# Patient Record
Sex: FEMALE | Race: WHITE
[De-identification: names, ages, dates, MRNs, and addresses within clinical notes are randomized per-mention and may not be internally consistent; named-entity substitution may affect disease eponyms.]

---

## 2019-07-04 ENCOUNTER — HOSPITAL ENCOUNTER (EMERGENCY)
Dept: HOSPITAL 25 - UCCORT | Age: 24
Discharge: HOME | End: 2019-07-04
Payer: COMMERCIAL

## 2019-07-04 VITALS — DIASTOLIC BLOOD PRESSURE: 76 MMHG | SYSTOLIC BLOOD PRESSURE: 120 MMHG

## 2019-07-04 DIAGNOSIS — J32.9: Primary | ICD-10-CM

## 2019-07-04 DIAGNOSIS — Z88.0: ICD-10-CM

## 2019-07-04 PROCEDURE — 99211 OFF/OP EST MAY X REQ PHY/QHP: CPT

## 2019-07-04 PROCEDURE — G0463 HOSPITAL OUTPT CLINIC VISIT: HCPCS

## 2019-07-04 NOTE — UC
Throat Pain/Nasal Conrado HPI





- HPI Summary


HPI Summary: 





Head Congested for 2-3 days. C/o dizziness and feeling foggy and unbalanced. 





- History of Current Complaint


Chief Complaint: UCGeneralIllness


Stated Complaint: CONGESTION,DIZZY, "FOGGY"


Time Seen by Provider: 07/04/19 10:50


Hx Obtained From: Patient


Hx Last Menstrual Period: 2 weeks ago


Onset/Duration: Sudden Onset, Lasting Days


Severity: Mild


Pain Intensity: 0


Associated Signs & Symptoms: Positive: Sinus Discomfort, Nasal Discharge





- Allergies/Home Medications


Allergies/Adverse Reactions: 


 Allergies











Allergy/AdvReac Type Severity Reaction Status Date / Time


 


amoxicillin Allergy  GI Upset Verified 07/04/19 10:43














PMH/Surg Hx/FS Hx/Imm Hx


Previously Healthy: Yes





- Surgical History


Surgical History: None





- Family History


Known Family History: Positive: None


   Negative: Cardiac Disease, Hypertension





- Social History


Alcohol Use: None


Substance Use Type: None


Smoking Status (MU): Never Smoked Tobacco


Have You Smoked in the Last Year: No





- Immunization History


Vaccination Up to Date: Yes





Review of Systems


All Other Systems Reviewed And Are Negative: Yes


ENT: Positive: Nasal Discharge, Sinus Congestion


Neurological: Positive: Other - dizzyness





Physical Exam


Triage Information Reviewed: Yes


Appearance: No Pain Distress, Well-Nourished, Ill-Appearing


Vital Signs: 


 Initial Vital Signs











Temp  98.3 F   07/04/19 10:39


 


Pulse  76   07/04/19 10:39


 


Resp  15   07/04/19 10:39


 


BP  120/76   07/04/19 10:39


 


Pulse Ox  100   07/04/19 10:39











Vital Signs Reviewed: Yes


Eye Exam: Normal


ENT: Positive: Pharyngeal erythema - with PND, Nasal congestion, Nasal drainage

, TM bulging - bilateral with serous otitis bilateral


Dental Exam: Normal


Neck exam: Normal


Respiratory Exam: Normal


Respiratory: Positive: Chest non-tender, Lungs clear, Normal breath sounds


Cardiovascular Exam: Normal


Cardiovascular: Positive: RRR, No Murmur


Abdominal Exam: Normal


Bowel Sounds: Positive: Present


Musculoskeletal Exam: Normal


Neurological Exam: Normal


Psychological Exam: Normal


Skin Exam: Normal





Throat Pain/Nasal Course/Dx





- Course


Course Of Treatment: 





hx obtained, exam performed, meds reviewed, educated on self care of sinus 

congestion





- Differential Dx/Diagnosis


Differential Diagnosis/HQI/PQRI: Otitis Media, Pharyngitis, Sinusitis, URI


Provider Diagnosis: 


 Rhinosinusitis








Discharge





- Sign-Out/Discharge


Documenting (check all that apply): Patient Departure


All imaging exams completed and their final reports reviewed: No Studies





- Discharge Plan


Condition: Stable


Disposition: HOME


Patient Education Materials:  Rhinosinusitis (ED)


Referrals: 


Gini Walsh MD [Primary Care Provider] - 


Additional Instructions: 


1. increase fluids


2. Salt water gargles if sore throat, it will help remove the mucous from the 

back of the throat


3. Daily antihistamine for the next 2-3 weeks


4. OTHER: daily tbs. of local honey for the next 9-12 months will help with 

seasonal allergies


5. VICKS, HOT PACKS to THROAT, REST


6. Follow up as needed.





- Billing Disposition and Condition


Condition: STABLE


Disposition: Home

## 2019-07-11 ENCOUNTER — HOSPITAL ENCOUNTER (EMERGENCY)
Dept: HOSPITAL 25 - UCCORT | Age: 24
Discharge: HOME | End: 2019-07-11
Payer: COMMERCIAL

## 2019-07-11 VITALS — DIASTOLIC BLOOD PRESSURE: 76 MMHG | SYSTOLIC BLOOD PRESSURE: 116 MMHG

## 2019-07-11 DIAGNOSIS — H60.92: ICD-10-CM

## 2019-07-11 DIAGNOSIS — Z88.0: ICD-10-CM

## 2019-07-11 DIAGNOSIS — J32.9: ICD-10-CM

## 2019-07-11 DIAGNOSIS — Z88.1: ICD-10-CM

## 2019-07-11 DIAGNOSIS — H66.92: Primary | ICD-10-CM

## 2019-07-11 PROCEDURE — 99212 OFFICE O/P EST SF 10 MIN: CPT

## 2019-07-11 PROCEDURE — G0463 HOSPITAL OUTPT CLINIC VISIT: HCPCS

## 2019-07-11 NOTE — XMS REPORT
Continuity of Care Document (CCD)

 Created on:2019



Patient:Janna Ryan

Sex:Female

:1995

External Reference #:[id number]





Demographics







 Address  [address] [place] [address]

 

 Home Phone  [phone]

 

 Mobile Phone  [phone]

 

 Email Address  [email or web address]

 

 Preferred Language  en

 

 Marital Status  Not  or 

 

 Synagogue Affiliation  Unknown

 

 Race  White

 

 Ethnic Group  Not  or 









Author







 Name  Jonathan E. Cryer, MD

 

 Address  [address]



   Unavailable



   [place] [address]









Support







 Name  Relationship  Address  Phone

 

 Kandace Ryan  Mother  Unavailable  [phone]









Care Team Providers







 Name  Role  Phone

 

 Gini Alfonso M.D.  Care Team Information   Unavailable

 

 Gini Alfonso M.D.  Primary Care Physician  Unavailable









Payers







 Date  Identification Numbers  Payment Provider  Subscriber

 

 Effective:  Policy Number: [id number]  [employer]  Vania Ryan



 2019    [place]  









 PayID: [id number]  [address] [address]









 [place] [address]







Family History







 Date  Family Member(s)  Observation  Comments

 

   General  Allergies  

 

   General  Cancer  







Social History







 Type  Date  Description  Comments

 

 Birth Sex    Unknown  

 

 Occupation    /  

 

 Occupation    Teacher  Substitute

 

 Occupation    Student  

 

 Occupation    [employer]  

 

 Tobacco Use  Start: Unknown  Never Smoked Cigarettes  

 

 Tobacco Use  Start: Unknown  Never Smoked Cigars  

 

 Tobacco Use  Start: Unknown  Never Smoked A Pipe  

 

 Smokeless Tobacco    Never Used Smokeless Tobacco  

 

 ETOH Use    Denies alcohol use  

 

 Tobacco Use  Start: Unknown  Patient has never smoked  

 

 Smoking Status  Reviewed: 19  Patient has never smoked  







Allergies, Adverse Reactions, Alerts







 Active Allergies  Reaction  Severity  Comments  Date

 

 Amoxicillin        2019

 

 Latex        2019

 

 Zithromax        2019







Medications







 Active Medications  SIG  Qnty  Indications  Ordering Provider  Date

 

 Womens One Daily  1 by mouth every      Unknown  



               Tablets  day        



           

 

 Calcium 600  1 by mouth twice      Unknown  



         600mg Tablets  a day        



           

 

 Birth Control Pills  1 by mouth every      Unknown  



   day        

 

 UTI Antibiotic  prn      Unknown  







Vital Signs







 Date  Vital  Result  Comment

 

 2019  9:16am  Weight  124.00 lb  









 Weight  56.246 kg  

 

 Height  66 inches  5'6"

 

 Height in cm's  167.6 cm  

 

 BMI (Body Mass Index)  20.0 kg/m2  







Procedures







 Date  Code  Description  Status

 

 2019  87311  Fiberoptic Laryngoscopy  Completed







Encounters







 Type  Date  Location  Provider  Dx  Diagnosis

 

 Office Visit  2019  Kameron,Jenifer DELGADO  J38.2  Nodules of vocal



   9:15a  08  Cryer, MD    cords







Plan of Treatment

2019 - Jonathan E. Cryer, MDJ38.2 Nodules of vocal cordsNew Orders:Speech 
Therapy, Ordered: 19

## 2019-07-11 NOTE — UC
Ear Complaint HPI





- HPI Summary


HPI Summary: 


24-year-old woman comes in with a chief complaint of left ear pain.  Patient 

has had upper respiratory tract infection symptoms for 10-11 days.  She has 

been trying Claritin.  Her rhinorrhea has turned yellow.  This morning she woke 

up with a severe left ear pain.  She took some acetaminophen which did not 

help.  No fevers.  Hearing is decreased in the left ear.  Patient is not a 

smoker.





- History of Current Complaint


Chief Complaint: UCEar


Stated Complaint: LEFT EAR PAIN


Time Seen by Provider: 07/11/19 07:44


Hx Last Menstrual Period: ~6/20/19


Pain Intensity: 8





- Allergies/Home Medications


Allergies/Adverse Reactions: 


 Allergies











Allergy/AdvReac Type Severity Reaction Status Date / Time


 


amoxicillin AdvReac  GI Upset Verified 07/11/19 07:32


 


azithromycin AdvReac  Vomiting Verified 07/11/19 07:32











Home Medications: 


 Home Medications





Acetaminophen TAB* [Tylenol TAB*] 650 mg PO Q4H PRN 07/11/19 [History Confirmed 

07/11/19]


LoraTADine TAB(NF) [Claritin 10 MG TAB(NF)] 10 mg PO DAILY 07/11/19 [History 

Confirmed 07/11/19]


Norethindrone-E.estradiol-Iron [Tri-Legest Fe] 1 tab PO DAILY 07/11/19 [History 

Confirmed 07/11/19]











PMH/Surg Hx/FS Hx/Imm Hx


Previously Healthy: Yes





- Surgical History


Surgical History: None





- Family History


Known Family History: Positive: None


   Negative: Cardiac Disease, Hypertension





- Social History


Alcohol Use: None


Substance Use Type: None


Smoking Status (MU): Never Smoked Tobacco


Have You Smoked in the Last Year: No





- Immunization History


Vaccination Up to Date: Yes





Review of Systems


All Other Systems Reviewed And Are Negative: Yes


Constitutional: Positive: Negative


Skin: Positive: Negative


Eyes: Positive: Negative


ENT: Positive: Ear Ache, Nasal Discharge, Sinus Congestion, Sinus Pain/

Tenderness


Respiratory: Positive: Negative


Cardiovascular: Positive: Negative


Gastrointestinal: Positive: Negative


Motor: Positive: Negative


Neurovascular: Positive: Negative


Musculoskeletal: Positive: Negative


Neurological: Positive: Negative


Psychological: Positive: Negative


Is Patient Immunocompromised?: No





Physical Exam


Triage Information Reviewed: Yes


Appearance: Well-Nourished, Ill-Appearing - MILD


Vital Signs: 


 Initial Vital Signs











Temp  98 F   07/11/19 07:31


 


Pulse  84   07/11/19 07:31


 


Resp  16   07/11/19 07:31


 


BP  116/76   07/11/19 07:31


 


Pulse Ox  98   07/11/19 07:31











Vital Signs Reviewed: Yes


Eye Exam: Normal


Eyes: Positive: Conjunctiva Clear


ENT: Positive: Pharyngeal erythema, Nasal congestion, Nasal drainage, TM 

bulging - LEFT, TM red - LEFT, Other - LEFT TRAGUS TENDER TO PALPATION. LEFT 

EAR CANAL ERYTHEMATOUS.


Neck: Positive: Supple


Respiratory: Positive: Lungs clear, Normal breath sounds, No respiratory 

distress


Cardiovascular: Positive: RRR


Musculoskeletal: Positive: Strength Intact, ROM Intact


Neurological Exam: Normal


Neurological: Positive: Alert, Muscle Tone Normal


Psychological Exam: Normal


Psychological: Positive: Age Appropriate Behavior


Skin Exam: Normal





Ear Complaint Course/Dx





- Differential Dx/Diagnosis


Provider Diagnosis: 


 Left otitis media, Left otitis externa, Sinusitis








Discharge





- Sign-Out/Discharge


Documenting (check all that apply): Patient Departure


All imaging exams completed and their final reports reviewed: No Studies





- Discharge Plan


Condition: Stable


Disposition: HOME


Prescriptions: 


Cefdinir [Cefdinir 300 MG CAP] 300 mg PO BID #20 cap


Fluticasone NASAL SPRAY 50MCG* [Flonase NASAL SPRAY 50MCG*] 2 spray BOTH NARES 

DAILY #1 btl


Ofloxacin 0.3% (Ear Drop)* [Floxin 0.3% OTIC.SOL (Ear Drop)] 5 drop LEFT EAR 

BID #1 btl


Patient Education Materials:  Sinusitis (ED), Otitis Externa (ED), Ear 

Infection (ED)


Forms:  *Work Release


Referrals: 


Gini Walsh MD [Primary Care Provider] - 


Additional Instructions: 


FOLLOW UP WITH YOUR DOCTOR IF NOT COMPLETELY IMPROVED.


GET REEVALUATED SOONER IF WORSE OR ANY QUESTIONS OR CONCERNS.








- Billing Disposition and Condition


Condition: STABLE


Disposition: Home

## 2019-07-16 ENCOUNTER — HOSPITAL ENCOUNTER (EMERGENCY)
Dept: HOSPITAL 25 - UCCORT | Age: 24
Discharge: HOME | End: 2019-07-16
Payer: COMMERCIAL

## 2019-07-16 VITALS — DIASTOLIC BLOOD PRESSURE: 70 MMHG | SYSTOLIC BLOOD PRESSURE: 107 MMHG

## 2019-07-16 DIAGNOSIS — R05: ICD-10-CM

## 2019-07-16 DIAGNOSIS — H66.92: ICD-10-CM

## 2019-07-16 DIAGNOSIS — Z88.1: ICD-10-CM

## 2019-07-16 DIAGNOSIS — Z88.0: ICD-10-CM

## 2019-07-16 DIAGNOSIS — Z51.89: Primary | ICD-10-CM

## 2019-07-16 PROCEDURE — 99211 OFF/OP EST MAY X REQ PHY/QHP: CPT

## 2019-07-16 PROCEDURE — G0463 HOSPITAL OUTPT CLINIC VISIT: HCPCS

## 2019-07-16 NOTE — UC
Ear Complaint HPI





- HPI Summary


HPI Summary: 





25 y/o female presents to the urgent care requesting a RE-check in her otitis 

media.  Pt reports ligia was seen here at the clinic on 7/11/2019 and Dx w/ left 

otitis Media and externa and Rx Cefdinir and ofloxacin. She still taking her 

oral antibiotic. She finished the otic drops about 2 day ago and yesterday b/L 

ear pain returned.  Pain is mild 2/10 associated nasal congestion w/ moderate 

PND clear and persistent dry cough.  Pt denies fever, SOB, chest pain, 

abdominal pain, N/V/D. She states she saw DR Cryer about 2 weeks ago for her 

throat nodules and her ear were fine.  











- History of Current Complaint


Stated Complaint: RECHECK - EAR ACHE


Time Seen by Provider: 07/16/19 12:24


Hx Obtained From: Patient


Hx Last Menstrual Period: ~6/20/19


Pregnant?: No


Onset/Duration: Gradual Onset, Lasting Weeks - 2 weeks, Still Present


Severity Initially: Mild


Severity Currently: Mild


Pain Intensity: 2 - B/L ear pain


Pain Scale Used: 0-10 Numeric


Aggravating Factors: Nothing


Alleviating Factors: Other (Noted In Comments) - Pt Rx Cefdinir PO and 

ofloxacin otic drops here at the clinic on 7/11/2019 for left otitis media and 

externa.  Pt still taking antibitics and otic drops already finished


Associated Signs/Symptoms: Positive: URI Symptoms


Related History: Seasonal Allergies





- Allergies/Home Medications


Allergies/Adverse Reactions: 


 Allergies











Allergy/AdvReac Type Severity Reaction Status Date / Time


 


amoxicillin AdvReac  GI Upset Verified 07/11/19 07:32


 


azithromycin AdvReac  Vomiting Verified 07/11/19 07:32











Home Medications: 


 Home Medications





Ibuprofen 800 mg PO SEE INSTRUCTIONS PRN 07/16/19 [History Confirmed 07/16/19]











PMH/Surg Hx/FS Hx/Imm Hx


Previously Healthy: Yes - Pt denies PMHX





- Surgical History


Surgical History: None





- Family History


Known Family History: Positive: None - Pt denies FMHX


   Negative: Cardiac Disease, Hypertension





- Social History


Occupation: Employed Full-time


Lives: With Family


Alcohol Use: None


Substance Use Type: None


Smoking Status (MU): Never Smoked Tobacco


Have You Smoked in the Last Year: No





- Immunization History


Vaccination Up to Date: Yes





Review of Systems


All Other Systems Reviewed And Are Negative: Yes


Constitutional: Positive: Negative


Skin: Positive: Negative


Eyes: Positive: Negative


ENT: Positive: Ear Ache - B/L ear pain, Sinus Congestion - getting better, 

Other - PND clear


Respiratory: Positive: Cough - dry


Cardiovascular: Positive: Negative


Gastrointestinal: Positive: Negative


Genitourinary: Positive: Negative


Motor: Positive: Negative


Neurovascular: Positive: Negative


Musculoskeletal: Positive: Negative


Neurological: Positive: Negative


Psychological: Positive: Negative


Is Patient Immunocompromised?: No





Physical Exam





- Summary


Physical Exam Summary: 





Vital signs: reviewed


General: well developed, well nourished female sitting in the examining table w/

o any apparent distress


Skin: Pink, warm and dry, no evidence of atopic dermatitis, psoriasis, 

seborrhea.


HEENT:


-Head: atraumatic, non tender; no scalp dermatitis.


-Eyes: sclera and conjunctiva clear, PERRLA, EOMI


-Ears: no pre- or postauricular lymphadenopathy or erythema;B/L external ear 

canal clears, Rt TM WNL,  LF TM w/ mild erythema, no fluid , mild bulging TM no 

perforation.


-Nose/Face: erythematous and edematous nasal mucosa with clear rhinorrhea, no 

frontal or maxillary sinus tender to palpation.


-Mouth/Throat: Mucous membrane moist, posterior pharynx clear, no erythema or 

exudates.


Neck: supple, FROM, nontender, no lymphadenopathy, no meningismus.


Chest: Clear to auscultation, normal breath sounds


Abd: soft, Bowel sounds active, Nontender.


Back: no spinal or CVAT


Neuro: A&O x4, GCS 15, no focal neuro deficits, normal behavior for age.





Triage Information Reviewed: Yes





Ear Complaint Course/Dx





- Course


Course Of Treatment: 


25 y/o female presents to the urgent care requesting a RE-check in her otitis 

media.  Pt reports ligia was seen here at the clinic on 7/11/2019 and Dx w/ left 

otitis Media and externa and Rx Cefdinir and ofloxacin. She still taking her 

oral antibiotic. She finished the otic drops about 2 day ago and yesterday b/L 

ear pain returned.  Pain is mild 2/10 associated nasal congestion w/ moderate 

PND clear and persistent dry cough.  Pt denies fever, SOB, chest pain, 

abdominal pain, N/V/D. She states she saw DR Cryer about 2 weeks ago for her 

throat nodules and her ear were fine.  Hx obtained.Pt w still w/ mild  otitis 

media on examination. It think it is resolving now.  Pt advised to continue 

taking Cefdinir, flonasa nasal spray. Advise to use saline drops to clear 

sinuses and Rx Tessalon tabs to alleviate cough.  Strongly advised if symptoms 

do not improve or worsen to f/u w/ her ENT DR Cryer for  further management. D/

c instructions explaiend.  Pt  understood and agreed with D/C  w/ plan of care. 








- Differential Dx/Diagnosis


Differential Diagnosis/HQI/PQRI: Cerumen Impaction, Otitis Externa, Otitis Media

, Perforated TM, URI


Provider Diagnosis: 


 Left otitis media, Cough








Discharge





- Sign-Out/Discharge


Documenting (check all that apply): Patient Departure - D/c home


All imaging exams completed and their final reports reviewed: No Studies





- Discharge Plan


Condition: Stable


Disposition: HOME


Prescriptions: 


Benzonatate CAP* [Tessalon 100 MG CAP*] 100 mg PO TID #21 cap


Patient Education Materials:  Ear Infection (ED)


Referrals: 


Gini Walsh MD [Primary Care Provider] - 3 Days


Additional Instructions: 


1- Please continue taking Cefdinir PO as directed full course of the antibiotic 

to avoid resistance.Take yogurts w/ probiotics or Culturelle to protect your GI 

system


2-Continue using  Flonase nasal spray as directed to help drain fluid. Also buy 

saline drops to clear sinuses


3-Continue taking Claritin PO  to alleviates sinus congestion.  Take Tessalon 

PO as directed to alleviate cough 


4-Please take ibuprofen PO q6-8hrs prn as instructed after meals to alleviate 

pain and swelling. Increase fluid intake, eat well, rest and avoid strenuous 

exercise


5-Stop using the Ofloxacin otic drops since esterna ear infection has resolved. 


6-Please f/u w/ your  PCP in 3 days if symptoms do not improve for further 

management and treatment




















- Billing Disposition and Condition


Condition: STABLE


Disposition: Home

## 2024-02-16 ENCOUNTER — OFFICE VISIT (OUTPATIENT)
Dept: URGENT CARE | Facility: CLINIC | Age: 29
End: 2024-02-16
Payer: COMMERCIAL

## 2024-02-16 VITALS
SYSTOLIC BLOOD PRESSURE: 120 MMHG | HEIGHT: 66 IN | HEART RATE: 73 BPM | WEIGHT: 142 LBS | OXYGEN SATURATION: 99 % | RESPIRATION RATE: 14 BRPM | TEMPERATURE: 98.5 F | DIASTOLIC BLOOD PRESSURE: 72 MMHG | BODY MASS INDEX: 22.82 KG/M2

## 2024-02-16 DIAGNOSIS — K52.9 NONINFECTIOUS GASTROENTERITIS, UNSPECIFIED TYPE: Primary | ICD-10-CM

## 2024-02-16 PROCEDURE — G0382 LEV 3 HOSP TYPE B ED VISIT: HCPCS | Performed by: NURSE PRACTITIONER

## 2024-02-16 RX ORDER — ASCORBIC ACID 125 MG
TABLET,CHEWABLE ORAL
COMMUNITY

## 2024-02-17 NOTE — PROGRESS NOTES
[facility]        NAME: Yadira Bellamy is a 28 y.o. female  : 1995    MRN: [id number]  DATE: 2024  TIME: 7:25 PM    Assessment and Plan   Noninfectious gastroenteritis, unspecified type [K52.9]  1. Noninfectious gastroenteritis, unspecified type            Recommended brat diet.  Advised to increase fluid intake.  May use over-the-counter antidiarrheal medications including Imodium.  Advised to seek further treatment by primary care provider in the ER with worsening diarrhea, abdominal pain, fever, or bloody stool.  Patient Instructions       Follow up with PCP in 3-5 days.  Proceed to  ER if symptoms worsen.    Chief Complaint     Chief Complaint   Patient presents with    Vomiting     N/V/D x 1 week with abdominal pain.          History of Present Illness       Patient is a 28-year-old female presenting with complaint of vomiting and diarrhea.  She states that she had 2 episodes of vomiting 6 days ago.  No further vomiting episodes.  However she states that she has had multiple episodes of diarrhea.  She is able to tolerate p.o. intake but states that she has diarrhea after eating.  Denies fever or chills.  Mild abdominal cramping.  No over-the-counter medications attempted.      Vomiting   Associated symptoms include diarrhea. Pertinent negatives include no abdominal pain, chills, fever or headaches.       Review of Systems   Review of Systems   Constitutional:  Negative for activity change, chills and fever.   Gastrointestinal:  Positive for diarrhea and vomiting. Negative for abdominal pain and nausea.   Neurological:  Negative for headaches.         Current Medications       Current Outpatient Medications:     Multiple Vitamins-Minerals (Multi Adult Gummies) CHEW, Chew, Disp: , Rfl:     Current Allergies     Allergies as of 2024 - Reviewed 2024   Allergen Reaction Noted    Amoxicillin Vomiting 2019            The following portions of the patient's history  "were reviewed and updated as appropriate: allergies, current medications, past family history, past medical history, past social history, past surgical history and problem list.     History reviewed. No pertinent past medical history.    History reviewed. No pertinent surgical history.    History reviewed. No pertinent family history.      Medications have been verified.        Objective   /72   Pulse 73   Temp 98.5 °F (36.9 °C)   Resp 14   Ht 5' 6\" (1.676 m)   Wt 64.4 kg (142 lb)   LMP 02/16/2024   SpO2 99%   BMI 22.92 kg/m²        Physical Exam     Physical Exam  Vitals reviewed.   Constitutional:       General: She is awake. She is not in acute distress.     Appearance: Normal appearance. She is normal weight.   Cardiovascular:      Rate and Rhythm: Normal rate.   Pulmonary:      Effort: Pulmonary effort is normal.   Skin:     General: Skin is warm and moist.   Neurological:      General: No focal deficit present.      Mental Status: She is alert and oriented to person, place, and time.   Psychiatric:         Behavior: Behavior is cooperative.                   "

## 2025-06-04 ENCOUNTER — OCCMED (OUTPATIENT)
Dept: URGENT CARE | Age: 30
End: 2025-06-04

## 2025-06-04 DIAGNOSIS — Z02.1 PHYSICAL EXAM, PRE-EMPLOYMENT: Primary | ICD-10-CM
